# Patient Record
Sex: FEMALE | Race: BLACK OR AFRICAN AMERICAN | Employment: OTHER | URBAN - METROPOLITAN AREA
[De-identification: names, ages, dates, MRNs, and addresses within clinical notes are randomized per-mention and may not be internally consistent; named-entity substitution may affect disease eponyms.]

---

## 2023-02-09 ENCOUNTER — HOSPITAL ENCOUNTER (EMERGENCY)
Age: 88
Discharge: HOME | End: 2023-02-09
Attending: EMERGENCY MEDICINE
Payer: MEDICARE

## 2023-02-09 ENCOUNTER — APPOINTMENT (OUTPATIENT)
Dept: GENERAL RADIOLOGY | Age: 88
End: 2023-02-09
Payer: MEDICARE

## 2023-02-09 VITALS
WEIGHT: 130 LBS | OXYGEN SATURATION: 96 % | DIASTOLIC BLOOD PRESSURE: 76 MMHG | HEART RATE: 64 BPM | BODY MASS INDEX: 23.92 KG/M2 | HEIGHT: 62 IN | RESPIRATION RATE: 16 BRPM | SYSTOLIC BLOOD PRESSURE: 181 MMHG | TEMPERATURE: 98.7 F

## 2023-02-09 DIAGNOSIS — M54.31 SCIATICA OF RIGHT SIDE: Primary | ICD-10-CM

## 2023-02-09 DIAGNOSIS — M16.10 HIP ARTHRITIS: ICD-10-CM

## 2023-02-09 PROCEDURE — 6370000000 HC RX 637 (ALT 250 FOR IP): Performed by: PHYSICIAN ASSISTANT

## 2023-02-09 PROCEDURE — 73502 X-RAY EXAM HIP UNI 2-3 VIEWS: CPT

## 2023-02-09 PROCEDURE — 99283 EMERGENCY DEPT VISIT LOW MDM: CPT | Performed by: EMERGENCY MEDICINE

## 2023-02-09 RX ORDER — PREDNISONE 1 MG/1
5 TABLET ORAL DAILY
Qty: 7 TABLET | Refills: 0 | Status: SHIPPED | OUTPATIENT
Start: 2023-02-09 | End: 2023-02-16

## 2023-02-09 RX ORDER — PREDNISONE 10 MG/1
10 TABLET ORAL
Status: COMPLETED | OUTPATIENT
Start: 2023-02-09 | End: 2023-02-09

## 2023-02-09 RX ADMIN — PREDNISONE 10 MG: 10 TABLET ORAL at 17:39

## 2023-02-09 ASSESSMENT — PAIN - FUNCTIONAL ASSESSMENT: PAIN_FUNCTIONAL_ASSESSMENT: NONE - DENIES PAIN

## 2023-02-09 NOTE — ED TRIAGE NOTES
Patient reports falling x 2-3 days ago, backwards on to buttocks/right hip. Patient reports intermittent pain in right leg from hip to foot primarily at night. Patient reports feeling off balance for a week or more intermittently.

## 2023-02-09 NOTE — DISCHARGE INSTRUCTIONS
Use daily prednisone with food every morning. May also use heating pad or muscle rub to the right hip area. Keep appointment next week with your primary care physician.   You may also benefit from using a cane to help you keep your balance return to ER if symptoms worsen

## 2023-02-09 NOTE — ED PROVIDER NOTES
[facility] Emergency Department Provider Note                   PCP:                Javad Beck MD               Age: 81 y.o. Sex: female       ICD-10-CM    1. Sciatica of right side  M54.31       2. Hip arthritis  M16.10           DISPOSITION Decision To Discharge 02/09/2023 06:02:11 PM       New Prescriptions    PREDNISONE (DELTASONE) 5 MG TABLET    Take 1 tablet by mouth daily for 7 days       Orders Placed This Encounter   Procedures    XR HIP RIGHT (2-3 VIEWS)         Nadeem Landers is a 81 y.o. female who presents to the Emergency Department with chief complaint of    Chief Complaint   Patient presents with    Leg Pain      Patient to ER complaining of 2 to 3-week history of slowly worsening right hip and leg pain. Patient states that has been feeling more weak. She states pain feels like it goes from her hip down into her foot at times. She feels this made her lose her balance and fall about a week ago she fell onto the hip but has been able to ambulate. She has been using Tylenol with some relief. She reports back in 2006 she may have had an MRI of her hip area and was told that she had a pinched nerve she had no surgical intervention the pain slowly went away on its own. Patient has been eating well no bowel or bladder symptoms. Patient has had no headache blurred vision. Patient states she has an appointment next week with her primary care physician and is looking for something to [facility] her over\" until she is able to to have that appointment    Past Medical History:  No date: Hypertension     History reviewed. No pertinent surgical history. Review of Systems   All other systems reviewed and are negative. All other systems reviewed and are negative. Past Medical History:   Diagnosis Date    Hypertension         History reviewed. No pertinent surgical history. History reviewed. No pertinent family history.      Social Connections: Not on file        No Known Allergies Vitals signs and nursing note reviewed. Patient Vitals for the past 4 hrs:   Temp Pulse Resp BP SpO2   02/09/23 1626 98.7 °F (37.1 °C) 64 16 (!) 181/76 96 %          Physical Exam  Vitals and nursing note reviewed. Constitutional:       Appearance: Normal appearance. She is normal weight. HENT:      Head: Normocephalic and atraumatic. Right Ear: External ear normal.      Left Ear: External ear normal.      Nose: Nose normal.      Mouth/Throat:      Mouth: Mucous membranes are moist.      Pharynx: Oropharynx is clear. Eyes:      Extraocular Movements: Extraocular movements intact. Pupils: Pupils are equal, round, and reactive to light. Cardiovascular:      Rate and Rhythm: Normal rate and regular rhythm. Pulses: Normal pulses. Heart sounds: Normal heart sounds. Pulmonary:      Effort: Pulmonary effort is normal.      Breath sounds: Normal breath sounds. No rales. Chest:      Chest wall: No tenderness. Abdominal:      General: Abdomen is flat. Palpations: Abdomen is soft. Tenderness: There is no abdominal tenderness. Hernia: No hernia is present. Musculoskeletal:         General: Tenderness present. Normal range of motion. Cervical back: Normal range of motion and neck supple. Comments: Pain to palpation right posterior hip/sciatic notch area. Pain does radiate down the back of the leg to the knee area. No radiation of pain into the groin. Patient has no tenderness to the lumbar spine. Full painless range of motion of the hip and knee. No swelling to the ankle. Patient states pain is mostly when she tries to ambulate not while sitting   Skin:     General: Skin is warm and dry. Neurological:      General: No focal deficit present. Mental Status: She is alert and oriented to person, place, and time.    Psychiatric:         Mood and Affect: Mood normal.         Behavior: Behavior normal.        MDM  Number of Diagnoses or Management Options  Hip arthritis  Sciatica of right side  Diagnosis management comments: Right hip and leg pain for the past 3 weeks. Patient refused steroid injection she did agree to oral prednisone. X-rays of the hip and lumbar spine were ordered patient refused lumbar spine films right hip x-rays show degenerative changes no obvious fracture. Patient does not have groin pain but this could cause her pain with ambulation. Patient states she wants to see her primary care physician before anything else is done as far as x-rays or studies. Patient has no history of diabetes therefore will place on short course of prednisone, stressed to use a cane. May return to ER if pain worsens before her visit to her primary care doctor next week    Complexity of Problem: 1 stable, acute illness. (3)    I have conducted an independent ordering and review of X-rays. I have reviewed records from an external source: provider visit notes from PCP. Rt hip pain vs sciatica rx for prednisone for 5 days, see pmd for recheck         Amount and/or Complexity of Data Reviewed  Tests in the radiology section of CPT®: ordered and reviewed  Review and summarize past medical records: yes    Risk of Complications, Morbidity, and/or Mortality  Presenting problems: moderate  Diagnostic procedures: moderate  Management options: low    Patient Progress  Patient progress: improved      Procedures    Labs Reviewed - No data to display     XR HIP RIGHT (2-3 VIEWS)    (Results Pending)            [place] Coma Scale  Eye Opening: Spontaneous  Best Verbal Response: Oriented  Best Motor Response: Obeys commands  [place] Coma Scale Score: 15                     Voice dictation software was used during the making of this note. This software is not perfect and grammatical and other typographical errors may be present. This note has not been completely proofread for errors.      ANATOLY Cuadra  02/09/23 [address] [place] [address] [place]  02/09/23 1809

## 2023-02-10 NOTE — ED NOTES
I have reviewed discharge instructions with the patient and friend. The patient and friend verbalized understanding. Patient left ED via Discharge Method: ambulatory to Home with self and friend. Opportunity for questions and clarification provided. Patient given 1 scripts. To continue your aftercare when you leave the hospital, you may receive an automated call from our care team to check in on how you are doing. This is a free service and part of our promise to provide the best care and service to meet your aftercare needs.  If you have questions, or wish to unsubscribe from this service please call [phone]. Thank you for Choosing our [facility] Emergency Department.         Kyaw Smith RN  02/09/23 9335